# Patient Record
Sex: MALE | Race: WHITE
[De-identification: names, ages, dates, MRNs, and addresses within clinical notes are randomized per-mention and may not be internally consistent; named-entity substitution may affect disease eponyms.]

---

## 2019-09-28 ENCOUNTER — HOSPITAL ENCOUNTER (EMERGENCY)
Dept: HOSPITAL 56 - MW.ED | Age: 49
Discharge: HOME | End: 2019-09-28
Payer: SELF-PAY

## 2019-09-28 DIAGNOSIS — K14.0: Primary | ICD-10-CM

## 2019-09-28 LAB
BUN SERPL-MCNC: 11 MG/DL (ref 7–18)
CHLORIDE SERPL-SCNC: 104 MMOL/L (ref 98–107)
CO2 SERPL-SCNC: 28.1 MMOL/L (ref 21–32)
GLUCOSE SERPL-MCNC: 103 MG/DL (ref 74–106)
POTASSIUM SERPL-SCNC: 4.1 MMOL/L (ref 3.5–5.1)
SODIUM SERPL-SCNC: 143 MMOL/L (ref 136–148)

## 2019-09-28 PROCEDURE — 96365 THER/PROPH/DIAG IV INF INIT: CPT

## 2019-09-28 PROCEDURE — 36415 COLL VENOUS BLD VENIPUNCTURE: CPT

## 2019-09-28 PROCEDURE — 96361 HYDRATE IV INFUSION ADD-ON: CPT

## 2019-09-28 PROCEDURE — 99284 EMERGENCY DEPT VISIT MOD MDM: CPT

## 2019-09-28 PROCEDURE — 71045 X-RAY EXAM CHEST 1 VIEW: CPT

## 2019-09-28 PROCEDURE — 80053 COMPREHEN METABOLIC PANEL: CPT

## 2019-09-28 PROCEDURE — 96375 TX/PRO/DX INJ NEW DRUG ADDON: CPT

## 2019-09-28 PROCEDURE — 70491 CT SOFT TISSUE NECK W/DYE: CPT

## 2019-09-28 PROCEDURE — 85025 COMPLETE CBC W/AUTO DIFF WBC: CPT

## 2019-09-28 NOTE — EDM.PDOC
ED HPI GENERAL MEDICAL PROBLEM





- General


Chief Complaint: ENT Problem


Stated Complaint: SOAR THROAT


Time Seen by Provider: 09/28/19 19:28


Source of Information: Reports: Patient


History Limitations: Reports: No Limitations





- History of Present Illness


INITIAL COMMENTS - FREE TEXT/NARRATIVE: 





HISTORY AND PHYSICAL:





History of present illness:


Patient is a 49-year-old male presents to the ED with complaint of tongue 

swelling. He states it has been swelling for the past 3 days and has 

progressively gotten worse. He states he had a tongue ring in that he had for 

months, took it out secondary to the swelling. He has not noticed any purulence 

drainage from the piercing site but notes a blister to the right side of the 

tongue. He states his tongue is painful, denies itching. He denies lip or 

throat swelling or itching. He reports subjective fevers. He is able to drink 

liquids fine but not eating solids secondary to the swelling. He denies 

significant past medical history, not on any medications. 





Review of systems: 


As per history of present illness and below otherwise all systems reviewed and 

negative.





Past medical history: 


As per history of present illness and as reviewed below otherwise 

noncontributory.





Surgical history: 


As per history of present illness and as reviewed below otherwise 

noncontributory.





Social history: 


No reported history of drug or alcohol abuse.





Family history: 


As per history of present illness and as reviewed below otherwise 

noncontributory.





Physical exam:


General: Patient sitting comfortably in no acute distress and nontoxic appearing


HEENT: Tongue is noticeable swollen and slightly erythematous. Piercing site 

without drainage. There is an area of blistering to the right lateral tongue. 

Throat is clear, tonsils not visualized. Tender anterior LAD. Atraumatic, 

normocephalic, pupils reactive, negative for conjunctival pallor or scleral 

icterus, mucous membranes moist, throat clear, neck supple, nontender, trachea 

midline. No meningeal signs. 


Lungs: Clear to auscultation, breath sounds equal bilaterally, chest nontender.


Heart: S1S2, regular, negative for clicks, rubs, or overt murmur.


Abdomen: Soft, nondistended, nontender. Negative for masses or 

hepatosplenomegaly. Negative for costovertebral tenderness. No rigidity, rebound

, guarding.


Pelvis: Stable nontender.


Genitourinary: Deferred.


Rectal: Deferred.


Extremities: Atraumatic, negative for cords or calf pain. Neurovascular 

unremarkable.


Neuro: Awake, alert, oriented. Cranial nerves II through XII unremarkable. 

Cerebellum unremarkable. Motor and sensory unremarkable throughout. Exam 

nonfocal.





Notes: Discussed with patient that I would like to keep him for observation and 

IV antibiotics. He declines at this time and understands my concerns and risks 

of airway compromise and possible death. 





Diagnostics:


CBC, CMP, CT soft tissue neck w/ contrast 





Therapeutics:


Solumedrol 125mg IV 


1g Rocephin IV 


1L NS IV 





Prescriptions:


Keflex


Prednisone





Impression: 


Tongue swelling and infection 





Plan:


Take antibiotic and steroid as instructed


Follow up with primary care provider


Return to ED as needed as discussed 





Definitive disposition and diagnosis as appropriate pending reevaluation and 

review of above.





  ** Throat


Pain Score (Numeric/FACES): 3





- Related Data


 Allergies











Allergy/AdvReac Type Severity Reaction Status Date / Time


 


No Known Allergies Allergy   Verified 09/28/19 19:25











Home Meds: 


 Home Meds





. [No Known Home Meds]  09/28/19 [History]











ED ROS ENT





- Review of Systems


Review Of Systems: ROS reveals no pertinent complaints other than HPI.





ED EXAM, ENT





- Physical Exam


Exam: See Below (see dictation)





Course





- Vital Signs


Last Recorded V/S: 


 Last Vital Signs











Temp  97 F   09/28/19 19:26


 


Pulse  76   09/28/19 20:42


 


Resp  14   09/28/19 20:42


 


BP  127/82   09/28/19 20:42


 


Pulse Ox  95   09/28/19 20:42














- Orders/Labs/Meds


Orders: 


 Active Orders 24 hr











 Category Date Time Status


 


 Chest 1V Frontal [CR] Stat Exams  09/28/19 21:31 Ordered


 


 Sodium Chloride 0.9% [Saline Flush] Med  09/28/19 19:28 Active





 10 ml FLUSH ASDIRECTED PRN   


 


 Sodium Chloride 0.9% [Saline Flush] Med  09/28/19 19:28 Active





 2.5 ml FLUSH ASDIRECTED PRN   


 


 Saline Lock Insert [OM.PC] Stat Oth  09/28/19 19:28 Ordered








 Medication Orders





Sodium Chloride (Saline Flush)  10 ml FLUSH ASDIRECTED PRN


   PRN Reason: Keep Vein Open


Sodium Chloride (Saline Flush)  2.5 ml FLUSH ASDIRECTED PRN


   PRN Reason: Keep Vein Open








Labs: 


 Laboratory Tests











  09/28/19 09/28/19 Range/Units





  19:30 19:30 


 


WBC  14.19 H   (4.0-11.0)  K/uL


 


RBC  4.85   (4.50-5.90)  M/uL


 


Hgb  16.9   (13.0-17.0)  g/dL


 


Hct  49.1   (38.0-50.0)  %


 


MCV  101.2 H   (80.0-98.0)  fL


 


MCH  34.8 H   (27.0-32.0)  pg


 


MCHC  34.4   (31.0-37.0)  g/dL


 


RDW Std Deviation  47.2   (28.0-62.0)  fl


 


RDW Coeff of Jazmyn  13   (11.0-15.0)  %


 


Plt Count  237   (150-400)  K/uL


 


MPV  10.00   (7.40-12.00)  fL


 


Neut % (Auto)  66.9   (48.0-80.0)  %


 


Lymph % (Auto)  18.7   (16.0-40.0)  %


 


Mono % (Auto)  13.4   (0.0-15.0)  %


 


Eos % (Auto)  0.8   (0.0-7.0)  %


 


Baso % (Auto)  0.2   (0.0-1.5)  %


 


Neut # (Auto)  9.5 H   (1.4-5.7)  K/uL


 


Lymph # (Auto)  2.7 H   (0.6-2.4)  K/uL


 


Mono # (Auto)  1.9 H   (0.0-0.8)  K/uL


 


Eos # (Auto)  0.1   (0.0-0.7)  K/uL


 


Baso # (Auto)  0.0   (0.0-0.1)  K/uL


 


Nucleated RBC %  0.0   /100WBC


 


Nucleated RBCs #  0   K/uL


 


Sodium   143  (136-148)  mmol/L


 


Potassium   4.1  (3.5-5.1)  mmol/L


 


Chloride   104  ()  mmol/L


 


Carbon Dioxide   28.1  (21.0-32.0)  mmol/L


 


BUN   11  (7.0-18.0)  mg/dL


 


Creatinine   1.1  (0.8-1.3)  mg/dL


 


Est Cr Clr Drug Dosing   95.48  mL/min


 


Estimated GFR (MDRD)   > 60.0  ml/min


 


Glucose   103  ()  mg/dL


 


Calcium   9.4  (8.5-10.1)  mg/dL


 


Total Bilirubin   0.6  (0.2-1.0)  mg/dL


 


AST   15  (15-37)  IU/L


 


ALT   30  (14-63)  IU/L


 


Alkaline Phosphatase   94  ()  U/L


 


Total Protein   8.4 H  (6.4-8.2)  g/dL


 


Albumin   3.7  (3.4-5.0)  g/dL


 


Globulin   4.7 H  (2.6-4.0)  g/dL


 


Albumin/Globulin Ratio   0.8 L  (0.9-1.6)  











Meds: 


Medications











Generic Name Dose Route Start Last Admin





  Trade Name Freq  PRN Reason Stop Dose Admin


 


Sodium Chloride  10 ml  09/28/19 19:28  





  Saline Flush  FLUSH   





  ASDIRECTED PRN   





  Keep Vein Open   





     





     





     


 


Sodium Chloride  2.5 ml  09/28/19 19:28  





  Saline Flush  FLUSH   





  ASDIRECTED PRN   





  Keep Vein Open   





     





     





     














Discontinued Medications














Generic Name Dose Route Start Last Admin





  Trade Name Freq  PRN Reason Stop Dose Admin


 


Ceftriaxone Sodium/Dextrose 1  50 mls @ 100 mls/hr  09/28/19 19:59  09/28/19 20:

10





  gm/ Premix  IV  09/28/19 20:28  100 mls/hr





  ONETIME ONE   Administration





     





     





     





     


 


Sodium Chloride  1,000 mls @ 999 mls/hr  09/28/19 20:00  09/28/19 20:10





  Normal Saline  IV  09/28/19 21:00  999 mls/hr





  STAT ONE   Administration





     





     





     





     


 


Iopamidol  80 ml  09/28/19 20:51  09/28/19 20:51





  Isovue Multipack-370 (76%)  IVPUSH  09/28/19 20:52  80 ml





  ONETIME STA   Administration





     





     





     





     


 


Methylprednisolone Sodium Succinate  125 mg  09/28/19 19:28  09/28/19 19:41





  Solu-Medrol  IVPUSH  09/28/19 19:29  125 mg





  ONETIME ONE   Administration





     





     





     





     














Departure





- Departure


Time of Disposition: 21:36


Disposition: Home, Self-Care 01


Condition: Good


Clinical Impression: 


 Tongue swelling, Tongue infection








- Discharge Information


Referrals: 


PCP,None [Primary Care Provider] - 


Forms:  ED Department Discharge


Additional Instructions: 


The following information is given to patients seen in the emergency department 

who are being discharged to home. This information is to outline your options 

for follow-up care. We provide all patients seen in our emergency department 

with a follow-up referral.





The need for follow-up, as well as the timing and circumstances, are variable 

depending upon the specifics of your emergency department visit.





If you don't have a primary care physician on staff, we will provide you with a 

referral. We always advise you to contact your personal physician following an 

emergency department visit to inform them of the circumstance of the visit and 

for follow-up with them and/or the need for any referrals to a consulting 

specialist.





The emergency department will also refer you to a specialist when appropriate. 

This referral assures that you have the opportunity for follow-up care with a 

specialist. All of these measure are taken in an effort to provide you with 

optimal care, which includes your follow-up.





Under all circumstances we always encourage you to contact your private 

physician who remains a resource for coordinating your care. When calling for 

follow-up care, please make the office aware that this follow-up is from your 

recent emergency room visit. If for any reason you are refused follow-up, 

please contact the [facility] Emergency 

Department at [phone] and asked to speak to the emergency department 

charge nurse.





[facility]


Primary Care


[address] [place] [address]


Phone: [phone]


Fax: [phone]





[address] [address]


Phone: [phone]


Fax: [phone]





Take antibiotic and steroid as instructed


Follow up with primary care provider


Return to ED as needed as discussed 








- My Orders


Last 24 Hours: 


My Active Orders





09/28/19 19:28


Sodium Chloride 0.9% [Saline Flush]   10 ml FLUSH ASDIRECTED PRN 


Sodium Chloride 0.9% [Saline Flush]   2.5 ml FLUSH ASDIRECTED PRN 


Saline Lock Insert [OM.PC] Stat 





09/28/19 21:31


Chest 1V Frontal [CR] Stat 














- Assessment/Plan


Last 24 Hours: 


My Active Orders





09/28/19 19:28


Sodium Chloride 0.9% [Saline Flush]   10 ml FLUSH ASDIRECTED PRN 


Sodium Chloride 0.9% [Saline Flush]   2.5 ml FLUSH ASDIRECTED PRN 


Saline Lock Insert [OM.PC] Stat 





09/28/19 21:31


Chest 1V Frontal [CR] Stat

## 2019-09-28 NOTE — CR
INDICATION: R/O infiltrate



TECHNIQUE:



Chest 1 view. 



COMPARISON:



None. 



FINDINGS:



Cardiovascular and mediastinum: Heart size and vasculature are normal in 

caliber and appearance.  Mediastinum is within normal limits.  



Lungs and pleural space: Lungs are clear.  No sign of infiltrate or mass.  

No sign of pleural effusion.  No pneumothorax.  



Bones and soft tissues: No significant findings.  



IMPRESSION:



Unremarkable chest.



Dictated by: Thee Storey MD @ 09/28/2019 21:51:43



(Electronically Signed)

## 2019-09-28 NOTE — CT
INDICATION:



Tongue swelling for the last 3-4  days; has had tongue piercing for 8 

years.



COMPARISON:



None.



TECHNIQUE:



CT neck soft tissues with intravenous contrast; coronal and sagittal 

reformats.



FINDINGS:



no abnormal cervical lymphadenopathy. The parotid and the submandibular 

glands are normal bilaterally. The vascular structures in the neck are 

unremarkable . No parapharyngeal or  retropharyngeal pathology. No obvious 

pathology involving the tongue on this CT study. No skeletal pathology. 

Imaging through the lung apices reveals possible area of consolidation in 

the right upper lobe slice 96 series 201; if clinically needed please 

obtain a chest radiograph for further assessment.



IMPRESSION:



1. Negative CT soft tissue neck with intravenous contrast.



2. Possible area of patchy consolidation right upper lobe of the lung; 

please obtain a chest radiograph for confirmation.



Please note that all CT scans at this facility use dose modulation, 

iterative reconstruction, and/or weight-based dosing when appropriate to 

reduce radiation dose to as low as reasonably achievable.



Dictated by Jen Skinner MD @ Sep 28 2019  9:25PM



Signed by Dr. Jen Skinner @ Sep 28 2019  9:29PM